# Patient Record
Sex: MALE | Race: WHITE | URBAN - METROPOLITAN AREA
[De-identification: names, ages, dates, MRNs, and addresses within clinical notes are randomized per-mention and may not be internally consistent; named-entity substitution may affect disease eponyms.]

---

## 2023-07-06 ENCOUNTER — OFFICE VISIT (OUTPATIENT)
Facility: LOCATION | Age: 73
End: 2023-07-06
Payer: MEDICARE

## 2023-07-06 DIAGNOSIS — H40.1133 PRIMARY OPEN-ANGLE GLAUCOMA, BILATERAL, SEVERE STAGE: ICD-10-CM

## 2023-07-06 DIAGNOSIS — H25.813 COMBINED FORMS OF AGE-RELATED CATARACT, BILATERAL: Primary | ICD-10-CM

## 2023-07-06 DIAGNOSIS — H43.391 OTHER VITREOUS OPACITIES, RIGHT EYE: ICD-10-CM

## 2023-07-06 DIAGNOSIS — H35.373 PUCKERING OF MACULA, BILATERAL: ICD-10-CM

## 2023-07-06 PROCEDURE — 99214 OFFICE O/P EST MOD 30 MIN: CPT | Performed by: OPHTHALMOLOGY

## 2023-07-06 ASSESSMENT — INTRAOCULAR PRESSURE
OD: 9
OS: 10
OD: 23
OS: 15

## 2023-07-06 NOTE — IMPRESSION/PLAN
Impression: Other vitreous opacities, right eye. 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 Described scenarios when they are likely to be most prominent. Reassurance was given to the patient.

## 2023-07-06 NOTE — IMPRESSION/PLAN
Impression: Primary open-angle glaucoma, bilateral, severe stage. '11/18/22 Dx Last Visit' Plan: Gonio: 11/18/22  VF: 11/18/22 RNFL: 03/01/23  Optos: 11/18/22  DFE: 11/18/22 Primary Open Angle Glaucoma OU Sister Glaucoma,  Hx of black eyes,  No steroids CCT: 659/633 Gonio: open to CBB 2+ ? angle recession OS 
RNFL:  Sev Thin OU ( stable OU) VF:  OD: [place]   OS: sup/inf dep   (OD Needs 10-2 from now on  OS: Needs 24-2 and there is progression) CCM  latanoprost QHS OU, Brimonidine BID OU Low blood pressure but not on BP meds Recommend initial baseline OCT of the nerves, CCT, Visual Fields, and ON Photos. Careful observation of intraocular pressures, anatomical, and functional tests are recommended. Patient understands that permanant blindness can occur without adequate pressure control and observation. Compliance is strongly urged. The patient is to call back with any worsening of symptoms or concerns.

## 2023-07-06 NOTE — IMPRESSION/PLAN
Impression: Combine Forms Age-Relate Cat Sushilat.  '07/27/22 Dx Last Visit' Plan: Cataract OU: Observe for now without intervention. The patient was advised to contact us if any change or worsening of vision.

## 2023-07-06 NOTE — IMPRESSION/PLAN
Impression: Puckering of macula, bilateral.  Epiretinal Membrane - Stable Plan: Epiretinal Membrane OU - Described disease pathophysiology with pateint. Given an [employer]. Return immediately for any worsening symptoms or change in vision.

## 2024-02-07 ENCOUNTER — OFFICE VISIT (OUTPATIENT)
Facility: LOCATION | Age: 74
End: 2024-02-07
Payer: MEDICARE

## 2024-02-07 DIAGNOSIS — H43.391 OTHER VITREOUS OPACITIES, RIGHT EYE: ICD-10-CM

## 2024-02-07 DIAGNOSIS — H40.1133 PRIMARY OPEN-ANGLE GLAUCOMA, BILATERAL, SEVERE STAGE: Primary | ICD-10-CM

## 2024-02-07 DIAGNOSIS — H35.373 PUCKERING OF MACULA, BILATERAL: ICD-10-CM

## 2024-02-07 DIAGNOSIS — H25.813 COMBINE FORMS AGE-RELATE CAT BILAT: ICD-10-CM

## 2024-02-07 PROCEDURE — 99214 OFFICE O/P EST MOD 30 MIN: CPT | Performed by: OPHTHALMOLOGY

## 2024-02-07 PROCEDURE — 92083 EXTENDED VISUAL FIELD XM: CPT | Performed by: OPHTHALMOLOGY

## 2024-02-07 RX ORDER — DORZOLAMIDE HCL 20 MG/ML
2 % SOLUTION/ DROPS OPHTHALMIC
Qty: 10 | Refills: 4 | Status: INACTIVE
Start: 2024-02-07 | End: 2024-05-06

## 2024-02-07 ASSESSMENT — INTRAOCULAR PRESSURE
OD: 12
OS: 10
OD: 10

## 2024-07-09 ENCOUNTER — OFFICE VISIT (OUTPATIENT)
Facility: LOCATION | Age: 74
End: 2024-07-09
Payer: MEDICARE

## 2024-07-09 DIAGNOSIS — H40.1133 PRIMARY OPEN-ANGLE GLAUCOMA, BILATERAL, SEVERE STAGE: Primary | ICD-10-CM

## 2024-07-09 DIAGNOSIS — H35.373 PUCKERING OF MACULA, BILATERAL: ICD-10-CM

## 2024-07-09 DIAGNOSIS — H43.391 OTHER VITREOUS OPACITIES, RIGHT EYE: ICD-10-CM

## 2024-07-09 PROCEDURE — 99214 OFFICE O/P EST MOD 30 MIN: CPT | Performed by: OPHTHALMOLOGY

## 2024-07-09 PROCEDURE — 92133 CPTRZD OPH DX IMG PST SGM ON: CPT | Performed by: OPHTHALMOLOGY

## 2024-07-09 RX ORDER — NETARSUDIL 0.2 MG/ML
0.02 % SOLUTION/ DROPS OPHTHALMIC; TOPICAL
Qty: 2.5 | Refills: 4 | Status: INACTIVE
Start: 2024-07-09 | End: 2024-10-06

## 2024-07-09 ASSESSMENT — INTRAOCULAR PRESSURE
OD: 13
OS: 11
OS: 10
OD: 12

## 2024-10-10 ENCOUNTER — OFFICE VISIT (OUTPATIENT)
Facility: LOCATION | Age: 74
End: 2024-10-10
Payer: MEDICARE

## 2024-10-10 DIAGNOSIS — H35.373 PUCKERING OF MACULA, BILATERAL: ICD-10-CM

## 2024-10-10 DIAGNOSIS — H43.391 OTHER VITREOUS OPACITIES, RIGHT EYE: ICD-10-CM

## 2024-10-10 DIAGNOSIS — H40.1133 PRIMARY OPEN-ANGLE GLAUCOMA, BILATERAL, SEVERE STAGE: Primary | ICD-10-CM

## 2024-10-10 PROCEDURE — 99214 OFFICE O/P EST MOD 30 MIN: CPT | Performed by: OPHTHALMOLOGY

## 2024-10-10 RX ORDER — NETARSUDIL 0.2 MG/ML
0.02 % SOLUTION/ DROPS OPHTHALMIC; TOPICAL
Qty: 2.5 | Refills: 4 | Status: INACTIVE
Start: 2024-10-10 | End: 2025-01-07

## 2024-10-10 RX ORDER — DORZOLAMIDE HCL 20 MG/ML
2 % SOLUTION/ DROPS OPHTHALMIC
Qty: 10 | Refills: 4 | Status: INACTIVE
Start: 2024-10-10 | End: 2025-01-07

## 2024-10-10 RX ORDER — BRIMONIDINE TARTRATE 2 MG/ML
0.2 % SOLUTION/ DROPS OPHTHALMIC
Qty: 10 | Refills: 4 | Status: INACTIVE
Start: 2024-10-10 | End: 2025-01-07

## 2024-10-10 RX ORDER — LATANOPROST 50 UG/ML
0.005 % SOLUTION OPHTHALMIC
Qty: 7.5 | Refills: 3 | Status: ACTIVE
Start: 2024-10-10

## 2024-10-10 ASSESSMENT — INTRAOCULAR PRESSURE
OS: 12
OD: 12

## 2024-10-21 ENCOUNTER — OFFICE VISIT (OUTPATIENT)
Facility: LOCATION | Age: 74
End: 2024-10-21
Payer: MEDICARE

## 2024-10-21 DIAGNOSIS — H35.373 PUCKERING OF MACULA, BILATERAL: Primary | ICD-10-CM

## 2024-10-21 DIAGNOSIS — H43.393 OTHER VITREOUS OPACITIES, BILATERAL: ICD-10-CM

## 2024-10-21 DIAGNOSIS — H52.13 MYOPIA, BILATERAL: ICD-10-CM

## 2024-10-21 DIAGNOSIS — H25.813 COMBINED FORMS OF AGE-RELATED CATARACT, BILATERAL: ICD-10-CM

## 2024-10-21 PROCEDURE — 92134 CPTRZ OPH DX IMG PST SGM RTA: CPT

## 2024-10-21 PROCEDURE — 92012 INTRM OPH EXAM EST PATIENT: CPT

## 2024-10-21 ASSESSMENT — VISUAL ACUITY
OS: 20/40
OD: 20/40

## 2025-02-12 ENCOUNTER — OFFICE VISIT (OUTPATIENT)
Facility: LOCATION | Age: 75
End: 2025-02-12
Payer: MEDICARE

## 2025-02-12 DIAGNOSIS — H40.1133 PRIMARY OPEN-ANGLE GLAUCOMA, BILATERAL, SEVERE STAGE: Primary | ICD-10-CM

## 2025-02-12 DIAGNOSIS — H35.373 PUCKERING OF MACULA, BILATERAL: ICD-10-CM

## 2025-02-12 DIAGNOSIS — H43.393 OTHER VITREOUS OPACITIES, BILATERAL: ICD-10-CM

## 2025-02-12 PROCEDURE — 99214 OFFICE O/P EST MOD 30 MIN: CPT | Performed by: OPHTHALMOLOGY

## 2025-02-12 PROCEDURE — 92250 FUNDUS PHOTOGRAPHY W/I&R: CPT | Performed by: OPHTHALMOLOGY

## 2025-02-12 PROCEDURE — 92083 EXTENDED VISUAL FIELD XM: CPT | Performed by: OPHTHALMOLOGY

## 2025-02-12 ASSESSMENT — INTRAOCULAR PRESSURE
OD: 13
OS: 14

## 2025-08-11 ENCOUNTER — OFFICE VISIT (OUTPATIENT)
Facility: LOCATION | Age: 75
End: 2025-08-11
Payer: MEDICARE

## 2025-08-11 DIAGNOSIS — H40.1133 PRIMARY OPEN-ANGLE GLAUCOMA, BILATERAL, SEVERE STAGE: Primary | ICD-10-CM

## 2025-08-11 DIAGNOSIS — H43.393 OTHER VITREOUS OPACITIES, BILATERAL: ICD-10-CM

## 2025-08-11 DIAGNOSIS — H35.373 PUCKERING OF MACULA, BILATERAL: ICD-10-CM

## 2025-08-11 PROCEDURE — 92012 INTRM OPH EXAM EST PATIENT: CPT | Performed by: OPHTHALMOLOGY

## 2025-08-11 PROCEDURE — 92133 CPTRZD OPH DX IMG PST SGM ON: CPT | Performed by: OPHTHALMOLOGY

## 2025-08-11 ASSESSMENT — INTRAOCULAR PRESSURE
OS: 13
OD: 14